# Patient Record
Sex: MALE | Race: WHITE | URBAN - NONMETROPOLITAN AREA
[De-identification: names, ages, dates, MRNs, and addresses within clinical notes are randomized per-mention and may not be internally consistent; named-entity substitution may affect disease eponyms.]

---

## 2018-05-17 ENCOUNTER — PROCEDURE VISIT (OUTPATIENT)
Dept: NEUROLOGY | Age: 58
End: 2018-05-17
Payer: COMMERCIAL

## 2018-05-17 DIAGNOSIS — M54.2 NECK PAIN: ICD-10-CM

## 2018-05-17 DIAGNOSIS — R20.0 BILATERAL HAND NUMBNESS: ICD-10-CM

## 2018-05-17 DIAGNOSIS — M54.12 CERVICAL RADICULOPATHY: Primary | ICD-10-CM

## 2018-05-17 PROCEDURE — 95911 NRV CNDJ TEST 9-10 STUDIES: CPT | Performed by: PSYCHIATRY & NEUROLOGY

## 2018-05-17 PROCEDURE — 95886 MUSC TEST DONE W/N TEST COMP: CPT | Performed by: PSYCHIATRY & NEUROLOGY

## 2021-10-21 ENCOUNTER — PROCEDURE VISIT (OUTPATIENT)
Dept: NEUROLOGY | Age: 61
End: 2021-10-21
Payer: COMMERCIAL

## 2021-10-21 DIAGNOSIS — M79.605 LEFT LEG PAIN: ICD-10-CM

## 2021-10-21 DIAGNOSIS — M54.16 LUMBAR RADICULOPATHY: Primary | ICD-10-CM

## 2021-10-21 PROCEDURE — 95886 MUSC TEST DONE W/N TEST COMP: CPT | Performed by: PSYCHIATRY & NEUROLOGY

## 2021-10-21 PROCEDURE — 95910 NRV CNDJ TEST 7-8 STUDIES: CPT | Performed by: PSYCHIATRY & NEUROLOGY

## 2022-12-03 ENCOUNTER — CLINICAL DOCUMENTATION (OUTPATIENT)
Dept: INTERNAL MEDICINE CLINIC | Age: 62
End: 2022-12-03

## 2024-05-20 DIAGNOSIS — E89.0 POSTOPERATIVE HYPOTHYROIDISM: ICD-10-CM

## 2024-05-21 RX ORDER — LEVOTHYROXINE SODIUM 175 UG/1
175 TABLET ORAL DAILY
Qty: 30 TABLET | Refills: 1 | OUTPATIENT
Start: 2024-05-21

## 2024-05-28 DIAGNOSIS — E89.0 POSTOPERATIVE HYPOTHYROIDISM: ICD-10-CM

## 2024-05-29 RX ORDER — LEVOTHYROXINE SODIUM 175 UG/1
175 TABLET ORAL DAILY
Qty: 30 TABLET | Refills: 1 | OUTPATIENT
Start: 2024-05-29

## 2025-03-04 LAB — PROSTATE SPECIFIC ANTIGEN: 1.96 NG/ML

## 2025-04-01 ENCOUNTER — OFFICE VISIT (OUTPATIENT)
Age: 65
End: 2025-04-01
Payer: COMMERCIAL

## 2025-04-01 VITALS
WEIGHT: 256.4 LBS | HEART RATE: 68 BPM | BODY MASS INDEX: 38.86 KG/M2 | DIASTOLIC BLOOD PRESSURE: 76 MMHG | HEIGHT: 68 IN | SYSTOLIC BLOOD PRESSURE: 120 MMHG

## 2025-04-01 DIAGNOSIS — E21.3 HYPERPARATHYROIDISM: ICD-10-CM

## 2025-04-01 DIAGNOSIS — C73 THYROID CANCER (HCC): Primary | ICD-10-CM

## 2025-04-01 DIAGNOSIS — E83.51 HYPOCALCEMIA: ICD-10-CM

## 2025-04-01 DIAGNOSIS — E89.0 POSTOPERATIVE HYPOTHYROIDISM: ICD-10-CM

## 2025-04-01 PROCEDURE — 99214 OFFICE O/P EST MOD 30 MIN: CPT | Performed by: INTERNAL MEDICINE

## 2025-04-01 RX ORDER — FOLIC ACID 1 MG/1
1000 TABLET ORAL DAILY
COMMUNITY
Start: 2025-03-06

## 2025-04-01 RX ORDER — APIXABAN 5 MG/1
TABLET, FILM COATED ORAL
COMMUNITY
Start: 2024-05-30

## 2025-04-01 RX ORDER — LEVOTHYROXINE SODIUM 175 UG/1
175 TABLET ORAL DAILY
Qty: 90 TABLET | Refills: 1 | Status: SHIPPED | OUTPATIENT
Start: 2025-04-01

## 2025-04-01 NOTE — PROGRESS NOTES
[facility] [place] [facility]  [facility] ENDOCRINOLOGY  [address]  [address]  [facility] [address]  Dept: [phone]  Loc: [phone]     Visit Date: 11/8/2022    Olegario Atkinson is a 64 y.o. male who presents today for:  Chief Complaint   Patient presents with    Follow-up     Thyroid cancer             Subjective:      HPI   History of Present Illness        Olegario Atkinson is a 64 y.o. , male who comes for f/u for hypothyroidism and history of thyroid cancer.  Referring provider:JYOTI Mcdaniel CNP   This patient was diagnosed with hypothyroidism about 8 years ago following thyroidectomy for a multinodular goiter.  The pathology report revealed a micropapillary cancer.  He did not receive radioactive iodine therapy.  The patient is taking 175 mcg of Synthroid daily.  He reports no signs or symptoms of hypothyroidism. Does not have any discomfort in his neck and he really does not have any problems swallowing or any changes of his voice.  His most recent neck ultrasound was in 2023 and he did not show any evidence of disease recurrence.  History reviewed. No pertinent past medical history.   Past Surgical History:   Procedure Laterality Date    HERNIA REPAIR         History reviewed. No pertinent family history.  Social History     Tobacco Use    Smoking status: Never    Smokeless tobacco: Never   Substance Use Topics    Alcohol use: Yes     Comment: social      Current Outpatient Medications   Medication Sig Dispense Refill    ELIQUIS 5 MG TABS tablet       folic acid (FOLVITE) 1 MG tablet Take 1 tablet by mouth daily      CALCIUM PO Take by mouth      levothyroxine (SYNTHROID) 175 MCG tablet Take 1 tablet by mouth Daily 90 tablet 1    vitamin D (ERGOCALCIFEROL) 1.25 MG (59306 UT) CAPS capsule       esomeprazole Magnesium (NEXIUM) 40 MG PACK Take 1 packet by mouth daily 30 packet 0    ondansetron (ZOFRAN ODT) 4 MG disintegrating tablet Take 1 tablet by mouth every 8 hours

## 2025-04-02 LAB
CALCIUM SERPL-MCNC: 7.3 MG/DL (ref 8.8–10.5)
MAGNESIUM: 2 MG/DL (ref 1.8–2.5)
PTH INTACT: 23.4 PG/ML (ref 12–88)
T4 FREE: 1.02 NG/DL (ref 0.61–1.12)
TSH SERPL DL<=0.05 MIU/L-ACNC: 3.49 MCIU/ML (ref 0.49–4.67)
VITAMIN D 25-HYDROXY: 29.1 NG/ML (ref 30–100)

## 2025-04-05 ENCOUNTER — CLINICAL DOCUMENTATION (OUTPATIENT)
Age: 65
End: 2025-04-05

## 2025-04-05 ENCOUNTER — RESULTS FOLLOW-UP (OUTPATIENT)
Age: 65
End: 2025-04-05

## 2025-04-05 DIAGNOSIS — E83.51 HYPOCALCEMIA: Primary | ICD-10-CM

## 2025-04-05 RX ORDER — MULTIVIT-MIN/IRON/FOLIC ACID/K 18-600-40
2000 CAPSULE ORAL DAILY
Qty: 30 CAPSULE | Refills: 3 | COMMUNITY
Start: 2025-04-05

## 2025-04-05 NOTE — PROGRESS NOTES
Vitamin D level is low.  Calcium is low.  Thyroid levels are normal.  Start vitamin D 2000 international units daily.  Repeat vitamin D level in a month.  Obtain 24-hour urine calcium, creatinine and sodium.  Ordered.  Patient needs to be seen back in 2 months.

## 2025-04-05 NOTE — RESULT ENCOUNTER NOTE
Olegario Atkinson  lab test(s) were abnormal.  Vitamin D level is low.  Calcium is low.  Thyroid levels are normal.  Start vitamin D 2000 international units daily.  Repeat vitamin D level in a month.  Obtain 24-hour urine calcium, creatinine and sodium.  Ordered.  Patient needs to be seen back in 2 months.  Please contact patient and document response.

## 2025-04-08 NOTE — TELEPHONE ENCOUNTER
----- Message from Dr. Alessandro Burns MD sent at 4/5/2025  4:12 PM EDT -----  Olegario Atkinson  lab test(s) were abnormal.  Vitamin D level is low.  Calcium is low.  Thyroid levels are normal.  Start vitamin D 2000 international units daily.  Repeat vitamin D level in a month.  Obtain 24-hour urine calcium, creatinine and sodium.  Ordered.  Patient needs to be seen back in 2 months.  Please contact patient and document response.

## 2025-04-08 NOTE — TELEPHONE ENCOUNTER
Pt informed and verbalized understanding with no further questions at this time. Patient scheduled 6/23/25, follow up labs faxed to [facility].

## 2025-04-09 LAB — FREE T4 BY DIALYSIS: 1.9 NG/DL (ref 0.9–2.2)

## 2025-05-28 LAB
CALCIUM 24 HOUR URINE: 66 MG/24 HR (ref 100–300)
CALCIUM URINE: 4.1 MG/DL
CREATININE 24 HOUR UR: 2.3 GM/24 HR (ref 0.6–2)
CREATININE, RANDOM URINE: 141.8 MG/DL
Lab: 1440 MINUTES
SODIUM 24 HOUR URINE: 133 MEQ/24HR (ref 40–220)
SODIUM URINE: 83 MEQ/L
URINE TOTAL VOLUME: 1600 ML

## 2025-06-19 LAB
CALCIUM SERPL-MCNC: 7.9 MG/DL (ref 8.8–10.5)
VITAMIN D 25-HYDROXY: 25.2 NG/ML (ref 30–100)

## 2025-06-20 ENCOUNTER — RESULTS FOLLOW-UP (OUTPATIENT)
Age: 65
End: 2025-06-20

## 2025-06-20 NOTE — RESULT ENCOUNTER NOTE
Olegario Atkinson  lab test(s) were abnormal.  Vitamin D and calcium levels are low.  Keep existing appointment to discuss.  Please contact patient and document response.

## 2025-06-23 ENCOUNTER — OFFICE VISIT (OUTPATIENT)
Age: 65
End: 2025-06-23
Payer: COMMERCIAL

## 2025-06-23 VITALS
WEIGHT: 242.38 LBS | SYSTOLIC BLOOD PRESSURE: 116 MMHG | BODY MASS INDEX: 36.73 KG/M2 | DIASTOLIC BLOOD PRESSURE: 68 MMHG | HEIGHT: 68 IN | HEART RATE: 72 BPM

## 2025-06-23 DIAGNOSIS — E89.0 POSTOPERATIVE HYPOTHYROIDISM: Primary | ICD-10-CM

## 2025-06-23 DIAGNOSIS — E83.51 HYPOCALCEMIA: ICD-10-CM

## 2025-06-23 DIAGNOSIS — C73 THYROID CANCER (HCC): ICD-10-CM

## 2025-06-23 PROCEDURE — 99214 OFFICE O/P EST MOD 30 MIN: CPT | Performed by: INTERNAL MEDICINE

## 2025-06-23 RX ORDER — DICYCLOMINE HCL 20 MG
20 TABLET ORAL 3 TIMES DAILY PRN
COMMUNITY
Start: 2025-05-09 | End: 2025-06-23 | Stop reason: ALTCHOICE

## 2025-06-23 RX ORDER — LEVOTHYROXINE SODIUM 175 UG/1
175 TABLET ORAL DAILY
Qty: 90 TABLET | Refills: 1 | Status: SHIPPED | OUTPATIENT
Start: 2025-06-23

## 2025-06-23 RX ORDER — ESOMEPRAZOLE MAGNESIUM 40 MG/1
CAPSULE, DELAYED RELEASE ORAL DAILY
COMMUNITY
Start: 2025-06-02

## 2025-06-23 NOTE — TELEPHONE ENCOUNTER
----- Message from Dr. Alessandro Burns MD sent at 6/20/2025  2:34 PM EDT -----  Olegario Atkinson  lab test(s) were abnormal.  Vitamin D and calcium levels are low.  Keep existing appointment to discuss.  Please contact patient and document response.

## 2025-06-23 NOTE — PROGRESS NOTES
Hydroxy     Standing Status:   Future     Expected Date:   7/7/2025     Expiration Date:   9/23/2025    Calcium, Ionized     Standing Status:   Future     Expected Date:   7/7/2025     Expiration Date:   9/23/2025    Basic Metabolic Panel     Standing Status:   Future     Expected Date:   7/7/2025     Expiration Date:   9/23/2025           The risks and benefits of my recommendations, as well as other treatment options were discussed with the patient today. Questions were answered.  Follow up: 6 months and as needed.         Electronically signed by Alessandro Burns MD 6/23/2025 3:13 PM     **This report has been created using voice recognition software. It may   contain minor errors which are inherent in voice recognition technology.**

## 2025-06-23 NOTE — TELEPHONE ENCOUNTER
Pt informed and verbalized understanding with no further questions at this time.      ----- Message from Dr. Alessandro Burns MD sent at 6/20/2025  2:34 PM EDT -----  Olegario Atkinson  lab test(s) were abnormal.  Vitamin D and calcium levels are low.  Keep existing appointment to discuss.  Please contact patient and document response.